# Patient Record
Sex: FEMALE | Race: BLACK OR AFRICAN AMERICAN | NOT HISPANIC OR LATINO | URBAN - METROPOLITAN AREA
[De-identification: names, ages, dates, MRNs, and addresses within clinical notes are randomized per-mention and may not be internally consistent; named-entity substitution may affect disease eponyms.]

---

## 2023-12-18 ENCOUNTER — CLAIMS CREATED FROM THE CLAIM WINDOW (OUTPATIENT)
Dept: URBAN - METROPOLITAN AREA CLINIC 78 | Facility: CLINIC | Age: 33
End: 2023-12-18
Payer: COMMERCIAL

## 2023-12-18 VITALS
WEIGHT: 170.4 LBS | RESPIRATION RATE: 16 BRPM | HEART RATE: 84 BPM | DIASTOLIC BLOOD PRESSURE: 77 MMHG | BODY MASS INDEX: 29.09 KG/M2 | TEMPERATURE: 98.1 F | SYSTOLIC BLOOD PRESSURE: 108 MMHG | HEIGHT: 64 IN

## 2023-12-18 DIAGNOSIS — R10.13 DYSPEPSIA: ICD-10-CM

## 2023-12-18 DIAGNOSIS — A04.8 HELICOBACTER PYLORI (H. PYLORI) INFECTION: ICD-10-CM

## 2023-12-18 DIAGNOSIS — Z90.49 STATUS POST CHOLECYSTECTOMY: ICD-10-CM

## 2023-12-18 DIAGNOSIS — R51.9 FREQUENT HEADACHES: ICD-10-CM

## 2023-12-18 DIAGNOSIS — R14.0 ABDOMINAL BLOATING: ICD-10-CM

## 2023-12-18 PROBLEM — 428882003: Status: ACTIVE | Noted: 2023-12-18

## 2023-12-18 PROCEDURE — 99204 OFFICE O/P NEW MOD 45 MIN: CPT | Performed by: INTERNAL MEDICINE

## 2023-12-18 NOTE — HPI-TODAY'S VISIT:
Patient was referred by Dr. Pavithra Wiggins A copy of this document will be sent to the physician.    Patient is a 33-year-old pleasant female who is presenting for upper GI symptoms.  She is status postcholecystectomy in 2016.  She had COVID in September of this year  and since then she has noted change in her gut symptoms.  Her predominant complaint appears to be bloating nausea.  Her PCP evaluated and diagnosed her with H. pylori for which she is currently undergoing treatment amoxicillin and clarithromycin based tomorrow is the last day.  Her baseline was pretty good she had a diet responsive postcholecystectomy bowel pattern.  Since COVID she feels that something has essentially changed.  Bloating is another symptoms  Denies NSAID use  Mother had Breast cancer 50's  Denies GI cancers

## 2024-01-05 ENCOUNTER — TELEPHONE ENCOUNTER (OUTPATIENT)
Dept: URBAN - METROPOLITAN AREA CLINIC 78 | Facility: CLINIC | Age: 34
End: 2024-01-05

## 2024-01-09 ENCOUNTER — OUT OF OFFICE VISIT (OUTPATIENT)
Dept: URBAN - METROPOLITAN AREA SURGERY CENTER 15 | Facility: SURGERY CENTER | Age: 34
End: 2024-01-09
Payer: COMMERCIAL

## 2024-01-09 DIAGNOSIS — K29.60 OTHER GASTRITIS WITHOUT BLEEDING: ICD-10-CM

## 2024-01-09 DIAGNOSIS — B96.81 HELICOBACTER PYLORI [H. PYLORI] AS THE CAUSE OF DISEASES CLASSIFIED ELSEWHERE: ICD-10-CM

## 2024-01-09 DIAGNOSIS — K31.89 OTHER DISEASES OF STOMACH AND DUODENUM: ICD-10-CM

## 2024-01-09 DIAGNOSIS — R10.13 DYSPEPSIA: ICD-10-CM

## 2024-01-09 DIAGNOSIS — K22.9 IRREGULAR Z LINE OF ESOPHAGUS: ICD-10-CM

## 2024-01-09 PROCEDURE — 43239 EGD BIOPSY SINGLE/MULTIPLE: CPT | Performed by: INTERNAL MEDICINE

## 2024-01-09 PROCEDURE — 00731 ANES UPR GI NDSC PX NOS: CPT | Performed by: NURSE ANESTHETIST, CERTIFIED REGISTERED

## 2024-01-09 PROCEDURE — G8907 PT DOC NO EVENTS ON DISCHARG: HCPCS | Performed by: INTERNAL MEDICINE

## 2024-01-16 ENCOUNTER — TELEPHONE ENCOUNTER (OUTPATIENT)
Dept: URBAN - METROPOLITAN AREA CLINIC 78 | Facility: CLINIC | Age: 34
End: 2024-01-16

## 2024-01-16 ENCOUNTER — LAB OUTSIDE AN ENCOUNTER (OUTPATIENT)
Dept: URBAN - METROPOLITAN AREA CLINIC 78 | Facility: CLINIC | Age: 34
End: 2024-01-16

## 2024-01-18 LAB — H. PYLORI STOOL AG, EIA: POSITIVE

## 2024-01-22 ENCOUNTER — TELEPHONE ENCOUNTER (OUTPATIENT)
Dept: URBAN - METROPOLITAN AREA CLINIC 78 | Facility: CLINIC | Age: 34
End: 2024-01-22

## 2024-01-22 PROBLEM — 8493009: Status: ACTIVE | Noted: 2024-01-22

## 2024-01-22 RX ORDER — AMOXICILLIN 500 MG/1
2 CAPSULES CAPSULE ORAL
Qty: 40 CAPSULE | Refills: 0 | OUTPATIENT

## 2024-01-22 RX ORDER — OMEPRAZOLE 20 MG/1
1 CAPSULE 30 MINUTES BEFORE MORNING MEAL CAPSULE, DELAYED RELEASE ORAL TWICE A DAY
Qty: 20 | Refills: 0 | OUTPATIENT

## 2024-01-22 RX ORDER — LEVOFLOXACIN 500 MG/1
1 TABLET TABLET, FILM COATED ORAL ONCE A DAY
Qty: 10 | Refills: 0 | OUTPATIENT
Start: 2024-01-22 | End: 2024-02-01

## 2024-01-23 ENCOUNTER — TELEPHONE ENCOUNTER (OUTPATIENT)
Dept: URBAN - METROPOLITAN AREA CLINIC 78 | Facility: CLINIC | Age: 34
End: 2024-01-23

## 2024-01-23 RX ORDER — OMEPRAZOLE 20 MG/1
1 CAPSULE 30 MINUTES BEFORE MORNING MEAL CAPSULE, DELAYED RELEASE ORAL TWICE A DAY
Qty: 20 | Refills: 0

## 2024-02-02 ENCOUNTER — LAB (OUTPATIENT)
Dept: URBAN - METROPOLITAN AREA CLINIC 78 | Facility: CLINIC | Age: 34
End: 2024-02-02

## 2024-02-26 ENCOUNTER — OV EP (OUTPATIENT)
Dept: URBAN - METROPOLITAN AREA CLINIC 78 | Facility: CLINIC | Age: 34
End: 2024-02-26

## 2024-03-01 ENCOUNTER — OV EP (OUTPATIENT)
Dept: URBAN - METROPOLITAN AREA CLINIC 78 | Facility: CLINIC | Age: 34
End: 2024-03-01
Payer: COMMERCIAL

## 2024-03-01 VITALS
HEIGHT: 64 IN | WEIGHT: 168 LBS | TEMPERATURE: 98 F | HEART RATE: 80 BPM | SYSTOLIC BLOOD PRESSURE: 112 MMHG | DIASTOLIC BLOOD PRESSURE: 74 MMHG | BODY MASS INDEX: 28.68 KG/M2

## 2024-03-01 DIAGNOSIS — R19.8 CHANGE IN BOWEL FUNCTION: ICD-10-CM

## 2024-03-01 DIAGNOSIS — R10.13 DYSPEPSIA: ICD-10-CM

## 2024-03-01 DIAGNOSIS — A04.8 BACTERIAL INFECTION DUE TO H. PYLORI: ICD-10-CM

## 2024-03-01 DIAGNOSIS — R14.0 ABDOMINAL BLOATING: ICD-10-CM

## 2024-03-01 PROCEDURE — 99213 OFFICE O/P EST LOW 20 MIN: CPT | Performed by: INTERNAL MEDICINE

## 2024-03-01 RX ORDER — OMEPRAZOLE 20 MG/1
1 CAPSULE 30 MINUTES BEFORE MORNING MEAL CAPSULE, DELAYED RELEASE ORAL TWICE A DAY
Qty: 20 | Refills: 0 | Status: ACTIVE | COMMUNITY

## 2024-03-01 RX ORDER — AMOXICILLIN 500 MG/1
2 CAPSULES CAPSULE ORAL
Qty: 40 CAPSULE | Refills: 0 | Status: ACTIVE | COMMUNITY

## 2024-03-01 NOTE — PHYSICAL EXAM GASTROINTESTINAL
Abdomen , soft, tenderness Carnett positive  nondistended , no guarding or rigidity , no masses palpable , normal bowel sounds , Liver and Spleen , no hepatomegaly present , liver nontender , spleen not palpable

## 2024-03-01 NOTE — HPI-TODAY'S VISIT:
I reviewed the EGD findings and  with patient . Reviewed the path removed including path results  All questions answered  to satisfaction   Patient seen  in follow up  She reports completing abx 10 days ago  Key to timing test  She reports abx makes her go  BM freq is once per day KUB  Post cholecystectomy     Patient is a 33-year-old pleasant female who is presenting for upper GI symptoms.  She is status postcholecystectomy in 2016.  She had COVID in September of this year  and since then she has noted change in her gut symptoms.  Her predominant complaint appears to be bloating nausea.  Her PCP evaluated and diagnosed her with H. pylori for which she is currently undergoing treatment amoxicillin and clarithromycin based tomorrow is the last day.  Her baseline was pretty good she had a diet responsive postcholecystectomy bowel pattern.  Since COVID she feels that something has essentially changed.  Bloating is another symptoms  Denies NSAID use  Mother had Breast cancer 50's  Denies GI cancers

## 2024-03-13 ENCOUNTER — OV EP (OUTPATIENT)
Dept: URBAN - METROPOLITAN AREA CLINIC 78 | Facility: CLINIC | Age: 34
End: 2024-03-13

## 2024-06-03 ENCOUNTER — OFFICE VISIT (OUTPATIENT)
Dept: URBAN - METROPOLITAN AREA CLINIC 78 | Facility: CLINIC | Age: 34
End: 2024-06-03

## 2024-06-13 ENCOUNTER — DASHBOARD ENCOUNTERS (OUTPATIENT)
Age: 34
End: 2024-06-13

## 2024-06-14 ENCOUNTER — OFFICE VISIT (OUTPATIENT)
Dept: URBAN - METROPOLITAN AREA CLINIC 78 | Facility: CLINIC | Age: 34
End: 2024-06-14
Payer: COMMERCIAL

## 2024-06-14 VITALS
HEIGHT: 64 IN | RESPIRATION RATE: 14 BRPM | BODY MASS INDEX: 29.02 KG/M2 | WEIGHT: 170 LBS | SYSTOLIC BLOOD PRESSURE: 118 MMHG | TEMPERATURE: 98.1 F | HEART RATE: 92 BPM | DIASTOLIC BLOOD PRESSURE: 79 MMHG

## 2024-06-14 DIAGNOSIS — K62.5 RECTAL BLEEDING: ICD-10-CM

## 2024-06-14 DIAGNOSIS — A04.8 OTHER SPECIFIED BACTERIAL INTESTINAL INFECTIONS: ICD-10-CM

## 2024-06-14 DIAGNOSIS — R10.13 DYSPEPSIA: ICD-10-CM

## 2024-06-14 DIAGNOSIS — R14.0 ABDOMINAL BLOATING: ICD-10-CM

## 2024-06-14 PROCEDURE — 99213 OFFICE O/P EST LOW 20 MIN: CPT | Performed by: INTERNAL MEDICINE

## 2024-06-14 RX ORDER — AMOXICILLIN 500 MG/1
2 CAPSULES CAPSULE ORAL
Qty: 40 CAPSULE | Refills: 0 | COMMUNITY

## 2024-06-14 RX ORDER — OMEPRAZOLE 20 MG/1
1 CAPSULE 30 MINUTES BEFORE MORNING MEAL CAPSULE, DELAYED RELEASE ORAL TWICE A DAY
Qty: 20 | Refills: 0 | COMMUNITY

## 2024-06-14 NOTE — HPI-TODAY'S VISIT:
Patient is seen in follow-up Addressed the previous visit this visit is primarily to discuss persistent nausea occasional.  She is status post confirmation of eradication of H. pylori.   She also reports change in bowel habits but it is more towards constipation.  With that she noticed some rectal bleeding.BRBPR comes after having on stool and occassionaly on wiping. 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  She is status postcholecystectomy in 2016.  She had COVID in September of this year  and since then she has noted change in her gut symptoms.  Her predominant complaint appears to be bloating nausea.  Her PCP evaluated and diagnosed her with H. pylori for which she is currently undergoing treatment amoxicillin and clarithromycin based tomorrow is the last day.  Her baseline was pretty good she had a diet responsive postcholecystectomy bowel pattern.  Since COVID she feels that something has essentially changed.  Bloating is another symptoms  Denies NSAID use  Mother had Breast cancer 50's  Denies GI cancers